# Patient Record
Sex: MALE | URBAN - NONMETROPOLITAN AREA
[De-identification: names, ages, dates, MRNs, and addresses within clinical notes are randomized per-mention and may not be internally consistent; named-entity substitution may affect disease eponyms.]

---

## 2023-03-24 ENCOUNTER — HOSPITAL ENCOUNTER (EMERGENCY)
Facility: HOSPITAL | Age: 38
Discharge: LEFT BEFORE DISCHARGE | End: 2023-03-24

## 2023-03-24 DIAGNOSIS — Z53.21 ELOPED FROM EMERGENCY DEPARTMENT: Primary | ICD-10-CM

## 2023-03-24 PROCEDURE — 99211 OFF/OP EST MAY X REQ PHY/QHP: CPT

## 2023-03-24 NOTE — ED PROVIDER NOTES
Subjective   History of Present Illness    I signed up to see and evaluate patient. Upon going into room patient was not present and notified by RN that he has left the property. No evaluation, HPI, or ROS was preformed.     Review of Systems    No past medical history on file.    Not on File    No past surgical history on file.    No family history on file.             Objective   Physical Exam    Procedures           ED Course                                           MDM    Final diagnoses:   Eloped from emergency department       ED Disposition  ED Disposition     ED Disposition   Eloped    Condition   --    Comment   --             No follow-up provider specified.       Medication List      No changes were made to your prescriptions during this visit.          Nico Connelly PA-C  03/24/23 4099

## 2023-03-24 NOTE — ED NOTES
Pt was called back for triage but was not seen in [facility], girlfriend of pt in [facility] stated he was outside

## 2023-06-29 ENCOUNTER — TELEPHONE (OUTPATIENT)
Dept: INTERNAL MEDICINE | Age: 38
End: 2023-06-29

## 2023-08-23 ENCOUNTER — HOSPITAL ENCOUNTER (EMERGENCY)
Facility: HOSPITAL | Age: 38
Discharge: LEFT BEFORE DISCHARGE | End: 2023-08-23
Payer: COMMERCIAL

## 2023-08-23 ENCOUNTER — APPOINTMENT (OUTPATIENT)
Dept: CT IMAGING | Facility: HOSPITAL | Age: 38
End: 2023-08-23
Payer: COMMERCIAL

## 2023-08-23 VITALS
HEART RATE: 138 BPM | TEMPERATURE: 98.3 F | OXYGEN SATURATION: 94 % | SYSTOLIC BLOOD PRESSURE: 144 MMHG | DIASTOLIC BLOOD PRESSURE: 98 MMHG | RESPIRATION RATE: 17 BRPM | BODY MASS INDEX: 35.22 KG/M2 | HEIGHT: 70 IN | WEIGHT: 246 LBS

## 2023-08-23 DIAGNOSIS — Z53.29 LEFT AGAINST MEDICAL ADVICE: ICD-10-CM

## 2023-08-23 DIAGNOSIS — T88.7XXA MEDICATION SIDE EFFECT: Primary | ICD-10-CM

## 2023-08-23 PROCEDURE — 70450 CT HEAD/BRAIN W/O DYE: CPT

## 2023-08-23 PROCEDURE — 99284 EMERGENCY DEPT VISIT MOD MDM: CPT

## 2023-08-23 NOTE — ED NOTES
Obtained pts. Vitals. Pts. Heart rate was 138. Notified pt. That I was going to get an IV and blood work. Pt. Stated they did not want an IV. This nurse notified pt. That their HR was 138. Educated pt. That normal heart rate is between . Educated pt. That getting an IV would allow us to administer medications. Pt. Still refused IV line. NP notified. Pt. Does agree to be stuck for lab but that is it. Pt. Removed b/p and ecg leads.

## 2023-08-23 NOTE — ED PROVIDER NOTES
"Subjective   History of Present Illness    Patient is a pleasant 37-year-old gentleman with chief complaint of headache.  He presents to ED with his wife.  Chief complaint is new onset headache.  The patient describes that he has a history of IV drug use and is clean.  He established relationship with the Suboxone physician 4 weeks ago and started the medication around that time.  A week later, he has developed this persistent headache described as bitemporal region, posterior occiput, and feels like tightness throughout.  He denies any visual disturbance.  Denies any diplopia, blindness, or loss of vision.  He denies any one-sided weakness, nausea, vomiting, or neurological deficits.  He normally does not experience headaches.  He reports he takes Tylenol without any relief.  He is stressed because is so persistent.  He did have diarrhea before the symptoms but that subsided.  He denies any fever, neck pain or stiffness.  He denies any rash.  Denies any chest pain, pressure, tightness or shortness of breath.      Review of Systems   Constitutional:  Positive for activity change.   Eyes:  Negative for visual disturbance.   Respiratory: Negative.     Cardiovascular: Negative.    Gastrointestinal: Negative.    Genitourinary: Negative.    Musculoskeletal: Negative.    Skin: Negative.    Neurological:  Positive for headaches.   Psychiatric/Behavioral: Negative.     All other systems reviewed and are negative.    No past medical history on file.    No Known Allergies    No past surgical history on file.    No family history on file.    Social History     Socioeconomic History    Marital status: Single       Prior to Admission medications    Not on File       Medications - No data to display    /98 (BP Location: Right arm)   Pulse (!) 138   Temp 98.3 øF (36.8 øC) (Oral)   Resp 17   Ht 177.8 cm (70\")   Wt 112 kg (246 lb)   SpO2 94%   BMI 35.30 kg/mý       Objective   Physical Exam  Constitutional:       " Appearance: He is well-developed.   HENT:      Head: Normocephalic and atraumatic.      Right Ear: External ear normal.      Left Ear: External ear normal.      Nose: Nose normal.   Eyes:      Conjunctiva/sclera: Conjunctivae normal.      Pupils: Pupils are equal, round, and reactive to light.   Neck:      Trachea: No tracheal deviation.   Cardiovascular:      Rate and Rhythm: Regular rhythm. Tachycardia present.      Heart sounds: Normal heart sounds. No murmur heard.    No friction rub. No gallop.   Pulmonary:      Effort: Pulmonary effort is normal. No respiratory distress.      Breath sounds: Normal breath sounds. No wheezing or rales.   Chest:      Chest wall: No tenderness.   Abdominal:      General: Bowel sounds are normal. There is no distension.      Palpations: Abdomen is soft. There is no mass.      Tenderness: There is no abdominal tenderness. There is no guarding or rebound.   Musculoskeletal:         General: No tenderness or deformity. Normal range of motion.      Cervical back: Normal range of motion and neck supple. No rigidity or tenderness.   Skin:     General: Skin is warm and dry.      Capillary Refill: Capillary refill takes less than 2 seconds.      Coloration: Skin is not pale.      Findings: No erythema or rash.   Neurological:      General: No focal deficit present.      Mental Status: He is alert and oriented to person, place, and time.      Motor: No weakness.   Psychiatric:         Mood and Affect: Mood normal.         Behavior: Behavior normal.         Thought Content: Thought content normal.         Judgment: Judgment normal.       Procedures         Lab Results (last 24 hours)       ** No results found for the last 24 hours. **            CT Head Without Contrast    Result Date: 8/23/2023  Narrative: EXAMINATION: CT HEAD WO CONTRAST-   8/23/2023 6:47 PM CDT  HISTORY: Severe headache. Pressure sensation.  In order to have a CT radiation dose as low as reasonably achievable Automated  "Exposure Control was utilized for adjustment of the mA and/or KV according to patient size.  DLP in mGycm= 718.  No comparison.  Axial, sagittal, and coronal noncontrast CT imaging of the head.  The visualized paranasal sinuses are clear.  The brain and ventricles have an age appropriate appearance.  There is no hemorrhage or mass-effect. No acute infarction is seen.  No calvarial abnormality.      Impression: 1. No acute intracranial abnormality is seen.              This report was finalized on 08/23/2023 19:04 by Dr. Brandin Thomas MD.     ED Course  ED Course as of 08/23/23 2000   Wed Aug 23, 2023   1840 Patient refuses IV placement.  He is willing to get blood work and a scan of his head, but does not want laboratory data.  The patient is not accustomed to experiencing headaches.  I do believe possibly this is related to a side effect from the Suboxone by doing a complete laboratory data and studies for further evaluation.  Patient is agreeable to that part but not the IV placement.  We will recheck his vitals as well. [TK]   1946 Upon rechecking his vitals, the patient still is tachycardic at 138.  He is absolutely refusing IV placement.  Wearing a hard time getting some blood drawn as well.  We are able to get CT scan of head completed in which the radiologist reads as \"no acute intracranial abnormality seen.\"  I would like to get the blood work to see if there is any signs of abnormalities with his symptoms.  Currently, I do think that his headache is from the Suboxone.  She wanted to stop for a moment and for him to calm down first and retry getting labs. [TK]   1959 Patient apparently went to go smoke to calm down.  The ER charge nurse talk to him about risk and benefits of leaving his medical advice.  He left against medical advice without any informing this provider. No further treatment can be completed.  I did discuss tapering off the Suboxone prior to him leaving.  As well as importance of follow-up " with his physician that prescribed this medicine. [TK]      ED Course User Index  [TK] Ozzie Driscoll PA          [facility]      Final diagnoses:   Medication side effect   Left against medical advice     Disposition: Patient left AGAINST MEDICAL ADVICE.       Ozzie Driscoll PA  08/23/23 2000

## 2023-08-24 NOTE — ED NOTES
Attempted to obtain labs. Pt. Is agreeable to letting one more nurse attempt. Pt.  Is stating that they want to smoke and plan to leave in about 30 min.

## 2024-05-09 ENCOUNTER — APPOINTMENT (OUTPATIENT)
Dept: GENERAL RADIOLOGY | Facility: HOSPITAL | Age: 39
End: 2024-05-09
Payer: COMMERCIAL

## 2024-05-09 PROCEDURE — 71101 X-RAY EXAM UNILAT RIBS/CHEST: CPT

## 2025-04-12 ENCOUNTER — HOSPITAL ENCOUNTER (EMERGENCY)
Facility: HOSPITAL | Age: 40
Discharge: HOME | End: 2025-04-12
Payer: COMMERCIAL

## 2025-04-12 VITALS
OXYGEN SATURATION: 98 % | HEIGHT: 69 IN | BODY MASS INDEX: 37.18 KG/M2 | RESPIRATION RATE: 19 BRPM | HEART RATE: 117 BPM | TEMPERATURE: 98.6 F | DIASTOLIC BLOOD PRESSURE: 92 MMHG | WEIGHT: 251 LBS | SYSTOLIC BLOOD PRESSURE: 177 MMHG
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
## 2025-04-12 PROCEDURE — 99281 EMR DPT VST MAYX REQ PHY/QHP: CPT

## 2025-04-12 RX ORDER — BUPRENORPHINE HYDROCHLORIDE AND NALOXONE HYDROCHLORIDE DIHYDRATE 8; 2 MG/1; MG/1
1 TABLET SUBLINGUAL DAILY
COMMUNITY
Start: 2025-01-22

## 2025-04-12 RX ORDER — SODIUM CHLORIDE 0.9 % (FLUSH) 0.9 %
10 SYRINGE (ML) INJECTION AS NEEDED
Status: DISCONTINUED | OUTPATIENT
Start: 2025-04-12 | End: 2025-04-12 | Stop reason: HOSPADM
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
## 2025-04-12 NOTE — ED PROVIDER NOTES
Subjective   History of Present Illness  Patient is a 39-year-old male presents the emergency department with arthralgias to his hands and legs for the past 2 weeks.  He states he pulled off about 10 ticks off of his torso about 2 weeks ago.  He states she has been having swelling to his legs and his hands since then.  He states the reason he came in today was because he was having joint pain and swelling to his legs and hands today.  He denies any fever.  No nausea or vomiting.  No abdominal pain.  No fever.    History provided by:  Patient   used: No        Review of Systems   Constitutional:         Patient is a 39-year-old male presents the emergency department with arthralgias to his hands and legs for the past 2 weeks.  He states he pulled off about 10 ticks off of his torso about 2 weeks ago.  He states she has been having swelling to his legs and his hands since then.  He states the reason he came in today was because he was having joint pain and swelling to his legs and hands today.  He denies any fever.  No nausea or vomiting.  No abdominal pain.  No fever.     HENT: Negative.     Eyes: Negative.    Respiratory: Negative.     Cardiovascular: Negative.    Gastrointestinal: Negative.    Endocrine: Negative.    Genitourinary: Negative.    Musculoskeletal: Negative.    Skin: Negative.    Allergic/Immunologic: Negative.    Neurological: Negative.    Hematological: Negative.    Psychiatric/Behavioral: Negative.     All other systems reviewed and are negative.      History reviewed. No pertinent past medical history.    No Known Allergies    History reviewed. No pertinent surgical history.    History reviewed. No pertinent family history.    Social History     Socioeconomic History    Marital status: Single   Tobacco Use    Smoking status: Every Day     Types: Cigarettes    Smokeless tobacco: Never   Vaping Use    Vaping status: Never Used   Substance and Sexual Activity    Alcohol use: Not  "Currently    Drug use: Never    Sexual activity: Defer       Prior to Admission medications    Medication Sig Start Date End Date Taking? Authorizing Provider   lidocaine (LIDODERM) 5 % Place 1 patch on the skin as directed by provider Daily. Remove & Discard patch within 12 hours or as directed by MD 5/9/24   Jenelle Cummins APRN   predniSONE (DELTASONE) 10 MG (21) dose pack Use as directed on package 5/9/24   Jenelle Cummins APRN       /92   Pulse 117   Temp 98.6 °F (37 °C) (Oral)   Resp 19   Ht 175.3 cm (69\")   Wt 114 kg (251 lb)   SpO2 98%   BMI 37.07 kg/m²     Objective   Physical Exam  Vitals and nursing note reviewed.   Constitutional:       Appearance: He is well-developed.      Comments: Non toxic appearing. No acute distress   HENT:      Head: Normocephalic and atraumatic.   Eyes:      Conjunctiva/sclera: Conjunctivae normal.      Pupils: Pupils are equal, round, and reactive to light.   Cardiovascular:      Rate and Rhythm: Normal rate and regular rhythm.      Heart sounds: Normal heart sounds.   Pulmonary:      Effort: Pulmonary effort is normal.      Breath sounds: Normal breath sounds.   Abdominal:      General: Bowel sounds are normal.      Palpations: Abdomen is soft.      Comments: Mult tick bites to torso. No bullseye lesion noted.    Musculoskeletal:         General: Normal range of motion.      Cervical back: Normal range of motion and neck supple.      Comments: There is some soft tissue swelling noted to bilat hands and legs. Non pitting. Mild eryth noted to hands. No warmth on palpation to hands. Periph pulses palp. Pedal pulses palp    Skin:     General: Skin is warm and dry.   Neurological:      Mental Status: He is alert and oriented to person, place, and time.      Deep Tendon Reflexes: Reflexes are normal and symmetric.   Psychiatric:         Behavior: Behavior normal.         Thought Content: Thought content normal.         Judgment: Judgment normal. "         Procedures         Lab Results (last 24 hours)       ** No results found for the last 24 hours. **            No orders to display       ED Course  ED Course as of 04/12/25 1935   Sat Apr 12, 2025 1703 Reviewed pt and pt care plan with Dr. Sandoval- also assessed pt and in agreement with care plan  [CW]   1715 Evidently the pt eloped from the er before Dr. Sandoval could see the pt or labs could be done  [CW]      ED Course User Index  [CW] Janice Nguyễn APRN        Medical Decision Making  Patient is a 39-year-old male presents the emergency department with arthralgias to his hands and legs for the past 2 weeks.  He states he pulled off about 10 ticks off of his torso about 2 weeks ago.  He states she has been having swelling to his legs and his hands since then.  He states the reason he came in today was because he was having joint pain and swelling to his legs and hands today.  He denies any fever.  No nausea or vomiting.  No abdominal pain.  No fever.  Course of treatment in the er: non toxic appearing. No acute distress. Lungs cta. Cv nsr. There is soft tissue swelling and eryth to hands and mild soft tissue swelling noted to lower extremities. Perip pulses palp. Mult tick bites to torso - no bullseye lesion noted. No rash noted. Reviewed pt and pt care plan with Dr. Sandoval- also in agreement with care plan. Labs ordered  Differential diagnosis to include but not limited to: tick borne illness; arthralgias; skin infection; and other     Per nursing staff, the pt eloped from the er before Dr. Sandoval could assess the pt or labs could be obtained from the pt.     Problems Addressed:  Tick bite, unspecified site, initial encounter: acute illness or injury    Amount and/or Complexity of Data Reviewed  Labs: ordered.    Risk  Prescription drug management.         Final diagnoses:   Tick bite, unspecified site, initial encounter          Janice Nguyễn APRN  04/12/25 1935

## 2025-04-18 ENCOUNTER — HOSPITAL ENCOUNTER (EMERGENCY)
Age: 40
Discharge: HOME | End: 2025-04-18
Payer: MEDICAID

## 2025-04-18 VITALS
BODY MASS INDEX: 37.77 KG/M2 | SYSTOLIC BLOOD PRESSURE: 134 MMHG | OXYGEN SATURATION: 98 % | TEMPERATURE: 98.8 F | HEART RATE: 92 BPM | WEIGHT: 255 LBS | RESPIRATION RATE: 20 BRPM | DIASTOLIC BLOOD PRESSURE: 99 MMHG | HEIGHT: 69 IN

## 2025-04-18 DIAGNOSIS — S09.90XA CLOSED HEAD INJURY, INITIAL ENCOUNTER: ICD-10-CM

## 2025-04-18 DIAGNOSIS — S01.81XA FACIAL LACERATION, INITIAL ENCOUNTER: Primary | ICD-10-CM

## 2025-04-18 PROCEDURE — 99282 EMERGENCY DEPT VISIT SF MDM: CPT

## 2025-04-18 PROCEDURE — 12011 RPR F/E/E/N/L/M 2.5 CM/<: CPT

## 2025-04-18 ASSESSMENT — ENCOUNTER SYMPTOMS
GASTROINTESTINAL NEGATIVE: 1
EYES NEGATIVE: 1
RESPIRATORY NEGATIVE: 1
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
## 2025-04-18 NOTE — DISCHARGE INSTRUCTIONS
You may take Tylenol for pain, please monitor for redness, warmth, increased pain, swelling, drainage, and return to the ED, or discussed these symptoms with your doctor as this could indicate an infection and would require reevaluation and possible initiation of antibiotic.    You may shower as usual, allowing soap and warm running water to wash over this wound, but please do not scrub at it as it can displace the glue.    Patient is medically cleared for incarceration
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
